# Patient Record
Sex: MALE | Race: WHITE | URBAN - METROPOLITAN AREA
[De-identification: names, ages, dates, MRNs, and addresses within clinical notes are randomized per-mention and may not be internally consistent; named-entity substitution may affect disease eponyms.]

---

## 2024-01-06 ENCOUNTER — OFFICE VISIT (OUTPATIENT)
Dept: FAMILY MEDICINE CLINIC | Facility: CLINIC | Age: 89
End: 2024-01-06
Payer: COMMERCIAL

## 2024-01-06 VITALS
WEIGHT: 135 LBS | BODY MASS INDEX: 23.05 KG/M2 | HEIGHT: 64 IN | HEART RATE: 91 BPM | RESPIRATION RATE: 18 BRPM | OXYGEN SATURATION: 96 % | SYSTOLIC BLOOD PRESSURE: 128 MMHG | DIASTOLIC BLOOD PRESSURE: 72 MMHG | TEMPERATURE: 98 F

## 2024-01-06 DIAGNOSIS — R05.9 COUGH IN ADULT PATIENT: Primary | ICD-10-CM

## 2024-01-06 DIAGNOSIS — Z20.828 EXPOSURE TO RESPIRATORY SYNCYTIAL VIRUS (RSV): ICD-10-CM

## 2024-01-06 PROCEDURE — 3074F SYST BP LT 130 MM HG: CPT | Performed by: NURSE PRACTITIONER

## 2024-01-06 PROCEDURE — 99202 OFFICE O/P NEW SF 15 MIN: CPT | Performed by: NURSE PRACTITIONER

## 2024-01-06 PROCEDURE — 3008F BODY MASS INDEX DOCD: CPT | Performed by: NURSE PRACTITIONER

## 2024-01-06 PROCEDURE — 87637 SARSCOV2&INF A&B&RSV AMP PRB: CPT | Performed by: NURSE PRACTITIONER

## 2024-01-06 PROCEDURE — 3078F DIAST BP <80 MM HG: CPT | Performed by: NURSE PRACTITIONER

## 2024-01-06 RX ORDER — COLCHICINE 0.6 MG/1
0.3 TABLET ORAL DAILY
COMMUNITY
Start: 2023-12-27

## 2024-01-06 RX ORDER — INSULIN LISPRO 100 [IU]/ML
1 INJECTION, SUSPENSION SUBCUTANEOUS
COMMUNITY
Start: 2024-01-01

## 2024-01-06 RX ORDER — INSULIN GLARGINE 100 [IU]/ML
INJECTION, SOLUTION SUBCUTANEOUS
COMMUNITY
Start: 2023-08-10

## 2024-01-06 RX ORDER — METFORMIN HYDROCHLORIDE EXTENDED-RELEASE TABLETS 500 MG/1
500 TABLET, FILM COATED, EXTENDED RELEASE ORAL
COMMUNITY

## 2024-01-06 RX ORDER — CLOPIDOGREL BISULFATE 75 MG/1
75 TABLET ORAL DAILY
COMMUNITY
Start: 2023-12-04

## 2024-01-06 NOTE — PATIENT INSTRUCTIONS
Tylenol as needed  Rest, push fluids  Mucinex DM over the counter for nasal congestion/cough  START daily antihistamine (Zyrtec, Claritin, Allegra) and choose one of those. Take daily for 1-2 weeks to help with any post nasal drainage/sore throat  START Flonase nasal spray daily. 1 spray in each nostril  Return to care for new/worsening symptoms   We will notify you of nasal swab results when received

## 2024-01-06 NOTE — PROGRESS NOTES
Subjective:   Patient ID: Mekattukub Jeovany is a 89 year old male.    Patient is a 89 year old male who presents today with his daughter for complaints of cough, fatigue, low grade temps (TMAX 99F) and ear pain x 6 days. Denies body aches, chills, runny nose, nasal congestion, sore throat, SOB, wheezing, CP, n/v/d or abdominal pain. Tolerating PO well at home. Attempted treatment prior to arrival = cough drops and zinc. Wife + RSV, currently admitted in the hospital.        History/Other:   Review of Systems   Constitutional:  Positive for fatigue and fever. Negative for appetite change and chills.   HENT:  Positive for ear pain. Negative for congestion, rhinorrhea and sore throat.    Respiratory:  Positive for cough. Negative for shortness of breath and wheezing.    Cardiovascular:  Negative for chest pain.   Gastrointestinal:  Negative for abdominal pain, diarrhea, nausea and vomiting.   Musculoskeletal:  Negative for myalgias.     Current Outpatient Medications   Medication Sig Dispense Refill    HUMALOG MIX 50/50 KWIKPEN (50-50) 100 UNIT/ML Subcutaneous Suspension Pen-injector Place 1 Ring vaginally every 28 days. FOR 21 DAYS IN, THEN REMOVE FOR 7 DAYS      clopidogrel 75 MG Oral Tab Take 1 tablet (75 mg total) by mouth daily.      colchicine 0.6 MG Oral Tab Take 0.5 tablets (0.3 mg total) by mouth daily.      metFORMIN HCl ER, OSM, 500 MG (OSM) Oral Tablet 24 Hr Take 1 tablet (500 mg total) by mouth daily with breakfast.      LANTUS SOLOSTAR 100 UNIT/ML Subcutaneous Solution Pen-injector INJECT 20 UNITS BENEATH THE SKIN EVERY MORNING.       Allergies:No Known Allergies    /72   Pulse 91   Temp 98.1 °F (36.7 °C)   Resp 18   Ht 5' 4\" (1.626 m)   Wt 135 lb (61.2 kg)   SpO2 96%   BMI 23.17 kg/m²     Objective:   Physical Exam  Vitals reviewed.   Constitutional:       General: He is awake. He is not in acute distress.     Appearance: Normal appearance. He is well-developed and well-groomed. He is not  ill-appearing, toxic-appearing or diaphoretic.   HENT:      Head: Normocephalic and atraumatic.      Right Ear: Tympanic membrane, ear canal and external ear normal.      Left Ear: Tympanic membrane, ear canal and external ear normal.      Nose: Rhinorrhea present.      Mouth/Throat:      Lips: Pink.      Mouth: Mucous membranes are moist. No oral lesions.      Pharynx: Oropharynx is clear. Uvula midline.   Cardiovascular:      Rate and Rhythm: Normal rate and regular rhythm.   Pulmonary:      Effort: Pulmonary effort is normal. No respiratory distress.      Breath sounds: Normal breath sounds and air entry. No decreased breath sounds, wheezing, rhonchi or rales.   Lymphadenopathy:      Cervical: No cervical adenopathy.   Skin:     General: Skin is warm and dry.   Neurological:      Mental Status: He is alert and oriented to person, place, and time.   Psychiatric:         Behavior: Behavior is cooperative.         Assessment & Plan:   1. Cough in adult patient    2. Exposure to respiratory syncytial virus (RSV)        Orders Placed This Encounter   Procedures    SARS-CoV-2/Flu A and B/RSV by PCR ([facility]       Meds This Visit:  Requested Prescriptions      No prescriptions requested or ordered in this encounter     Quad screen collected and sent. Will notify of results.  Reassuring physical exam findings. Vitals WNL. No sign of bacterial etiology, RDS or dehydration at this time.  Supportive care and return to care measures reviewed.  Patient and daughter v/u and are comfortable with this plan.    Patient Instructions   Tylenol as needed  Rest, push fluids  Mucinex DM over the counter for nasal congestion/cough  START daily antihistamine (Zyrtec, Claritin, Allegra) and choose one of those. Take daily for 1-2 weeks to help with any post nasal drainage/sore throat  START Flonase nasal spray daily. 1 spray in each nostril  Return to care for new/worsening symptoms   We will notify you of nasal swab results when  received

## 2024-01-07 LAB
FLUAV + FLUBV RNA SPEC NAA+PROBE: NOT DETECTED
FLUAV + FLUBV RNA SPEC NAA+PROBE: NOT DETECTED
RSV RNA SPEC NAA+PROBE: DETECTED
SARS-COV-2 RNA RESP QL NAA+PROBE: NOT DETECTED